# Patient Record
Sex: MALE | Race: WHITE | NOT HISPANIC OR LATINO | URBAN - METROPOLITAN AREA
[De-identification: names, ages, dates, MRNs, and addresses within clinical notes are randomized per-mention and may not be internally consistent; named-entity substitution may affect disease eponyms.]

---

## 2023-08-03 PROBLEM — C18.1 PRIMARY APPENDICEAL ADENOCARCINOMA: Status: ACTIVE | Noted: 2023-08-03

## 2023-08-03 PROBLEM — C78.6 PERITONEAL CARCINOMATOSIS: Status: ACTIVE | Noted: 2023-08-03

## 2023-08-03 PROBLEM — K91.89: Status: ACTIVE | Noted: 2023-08-03

## 2023-08-04 ENCOUNTER — TELEPHONE (OUTPATIENT)
Dept: SURGERY | Facility: CLINIC | Age: 38
End: 2023-08-04
Payer: COMMERCIAL

## 2023-08-07 ENCOUNTER — TELEPHONE (OUTPATIENT)
Dept: SURGERY | Facility: CLINIC | Age: 38
End: 2023-08-07
Payer: COMMERCIAL

## 2023-08-17 ENCOUNTER — OFFICE VISIT (OUTPATIENT)
Dept: SURGERY | Facility: CLINIC | Age: 38
End: 2023-08-17
Payer: COMMERCIAL

## 2023-08-17 DIAGNOSIS — C18.1 PRIMARY APPENDICEAL ADENOCARCINOMA: ICD-10-CM

## 2023-08-17 DIAGNOSIS — C78.6 PERITONEAL CARCINOMATOSIS: ICD-10-CM

## 2023-08-17 PROCEDURE — 99203 OFFICE O/P NEW LOW 30 MIN: CPT | Mod: 95,,, | Performed by: SURGERY

## 2023-08-17 PROCEDURE — 99203 PR OFFICE/OUTPT VISIT, NEW, LEVL III, 30-44 MIN: ICD-10-PCS | Mod: 95,,, | Performed by: SURGERY

## 2023-08-17 NOTE — PROGRESS NOTES
Surgical Oncology Virtual Visit    Encounter Date:  2023    Patient ID: Paulo Maloney  Age:  37 y.o. :  1985    Chief Complaint:  Second Opinion Regarding HIPEC      History:    Mr. Maloney is a 37 y.o. 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 Baker. 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 He then underwent a right hemicolectomy the same admission for a T4aN0 lesion with no adjuvant therapy. Late /early  he developed osbtructive symptoms and was found to have an anastomotic recurrence on colonoscopy. He underwent exploration but had diffuse peritoneal disease with very extensive disease on the right side, so a small bowel to distal transverse colon bypass was performed. He was started on FOLFOX/Avastin and that was switched to FOLFIRI/Avastin due to a reaction to Oxaliplatin. He was seen by medical oncology at [facility], and continued systemic therapy with possible future diagnostic laparoscopy was recommended. He did not return due to insurance issues and a new baby with medical issues. Surveillance imaging has demonstrated increasing size of mesenteric nodes.He is now having worsening right sided pain and will see his colorectal surgeon soon.     Past Medical History:   Diagnosis Date    Cancer of appendix      Past Surgical History:   Procedure Laterality Date    APPENDECTOMY      COLON SURGERY       Current Outpatient Medications on File Prior to Visit   Medication Sig Dispense Refill    diphenoxylate-atropine 2.5-0.025 mg (LOMOTIL) 2.5-0.025 mg per tablet Take 1 tablet by mouth.      ondansetron (ZOFRAN) 4 MG tablet Take 4 mg by mouth.      ondansetron (ZOFRAN) 4 mg/5 mL solution Take 8 mg by mouth 2 (two)  times daily.      ondansetron (ZOFRAN) 8 MG tablet 1 TABLET EVERY 8 HOURS AS NEEDED      ondansetron (ZOFRAN-ODT) 4 MG TbDL Take 1 tablet (4 mg total) by mouth every 8 (eight) hours as needed (nausea). 15 tablet 0    oxyCODONE-acetaminophen (PERCOCET)  mg per tablet Take 1 tablet by mouth 2 (two) times daily as needed.      promethazine (PHENERGAN) 25 MG tablet Take 1 tablet (25 mg total) by mouth every 6 (six) hours as needed for Nausea. (Patient not taking: Reported on 11/3/2022) 15 tablet 0     No current facility-administered medications on file prior to visit.     Review of patient's allergies indicates:  No Known Allergies    Family History:  His family history is not on file.     Social History:  He reports that he has never smoked. He has never used smokeless tobacco. He reports that he does not drink alcohol and does not use drugs.     ROS:     Review of Systems  Pertinent positive/negatives detailed in HPI, all other systems negative.     Physical Exam:  There were no vitals taken for this visit.    Physical Exam    A physical exam was not performed as this was a virtual visit.     Data:     Radiology:  I personally reviewed these images: I reviewed his prior images and most recent CT. I do not have access to the images from March 2023, but there is some progression in the mesenteric lymph nodes from January 2023 - July 2023. There is evidence of limited peritoneal disease on imaging, but it was much more extensive per his prior operative report.    Pathology:    Final Pathologic Diagnosis 1. Right Hemicolectomy ([id number], collection date: 06/12/2020):     Adenocarcinoma with mucinous and signet ring cell components, high grade, involving appendiceal stump and wall of the cecum. See note.     Vascular space invasion is present.     No Perineural invasion is identified.     The tumor is present at the radial surgical resection margin.     The proximal and distal mucosal resection margins are  negative for carcinoma.     Nineteen lymph nodes, negative for carcinoma (0/19).     Note: H&E stained sections shows high grade adenocarcinoma with mucinous and signet ring cells components. A low grade component composed of tubules and small clusters of signet ring-like cells also identified in the wall of appendiceal stump. There is   no precursors mucinous or adenomatous lesion present. 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 The Immunohistochemistry performed at [facility], department of   Pathology shows that tumor cells are focally positive for Synaptophysin and negative for Chromogranin. These results support the diagnosis     The immunohistochemistry performed at the out side institution and slides reviewed for DNA repair proteins demonstrate preservation of nuclear staining for MLH1, MSH2, MSH6, and PMS2.  These results do not suggest the presence of an abnormality in DNA   repair mechanisms.     2. Omentum biopsy ([id number], collection date: 01/20/2023):     Metastatic adenocarcinoma with mucinous and signet ring cell components.     3. Adhesion biopsy ([id number], collection date: 01/20/2023):     Metastatic adenocarcinoma with mucinous and signet ring cell components.    Comment: Interp By Harpreet Hess, Signed on 08/06/2023 at 13:58         ICD-10-CM ICD-9-CM    1. Primary appendiceal adenocarcinoma  C18.1 153.5 Ambulatory referral/consult to Surgical Oncology      2. 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 He has an anastomotic recurrence following right hemicolectomy and progressive mesenteric lymphadenopathy on systemic therapy. Given that this is a poorly differentiated tumor with signet ring cells,  disease progression on chemotherapy, and the operative findings from his previous operation, I think the chance of a meaningful change in disease course with cytoreduction and HIPEC is low. I explained this to the patient, and he expressed his understanding.       Plan:  - Follow up as needed    The patient location is: home  The chief complaint leading to consultation is: second opinion regarding HIPEC  Visit type: audiovisual  Total time spent with patient: I spent >15 minutes face to face with the patient and > 30 minutes in total reviewing records and in documentation.      Each patient to whom he or she provides medical services by telemedicine is:  (1) informed of the relationship between the physician and patient and the respective role of any other health care provider with respect to management of the patient; and (2) notified that he or she may decline to receive medical services by telemedicine and may withdraw from such care at any time.     Marc Villar MD  Surgical Oncology  [facility] [place], [place]         Paulo Maloney 1985